# Patient Record
Sex: MALE | Race: OTHER
[De-identification: names, ages, dates, MRNs, and addresses within clinical notes are randomized per-mention and may not be internally consistent; named-entity substitution may affect disease eponyms.]

---

## 2023-09-05 ENCOUNTER — HOSPITAL ENCOUNTER (EMERGENCY)
Dept: HOSPITAL 15 - ER | Age: 23
Discharge: LEFT BEFORE DISCHARGE | End: 2023-09-05
Payer: COMMERCIAL

## 2023-09-05 VITALS
RESPIRATION RATE: 18 BRPM | OXYGEN SATURATION: 98 % | SYSTOLIC BLOOD PRESSURE: 120 MMHG | DIASTOLIC BLOOD PRESSURE: 63 MMHG

## 2023-09-05 VITALS — WEIGHT: 201.94 LBS | BODY MASS INDEX: 31.7 KG/M2 | HEIGHT: 67 IN

## 2023-09-05 VITALS — HEART RATE: 89 BPM

## 2023-09-05 DIAGNOSIS — Z79.899: ICD-10-CM

## 2023-09-05 DIAGNOSIS — R07.89: Primary | ICD-10-CM

## 2023-09-05 LAB
ALBUMIN SERPL-MCNC: 4.7 G/DL (ref 3.2–4.8)
ALP SERPL-CCNC: 151 U/L (ref 46–116)
ALT SERPL-CCNC: 158 U/L (ref 7–40)
ANION GAP SERPL CALCULATED.3IONS-SCNC: 7.7 MMOL/L (ref 5–15)
BILIRUB SERPL-MCNC: 0.5 MG/DL (ref 0.2–1)
BUN SERPL-MCNC: 13 MG/DL (ref 9–23)
BUN/CREAT SERPL: 17.8 (ref 10–20)
CALCIUM SERPL-MCNC: 9.4 MG/DL (ref 8.7–10.4)
CHLORIDE SERPL-SCNC: 104 MMOL/L (ref 98–107)
CO2 SERPL-SCNC: 25.3 MMOL/L (ref 20–30)
GLUCOSE SERPL-MCNC: 105 MG/DL (ref 74–106)
HCT VFR BLD AUTO: 43 % (ref 41–53)
HGB BLD-MCNC: 14.9 G/DL (ref 13.5–17.5)
MAGNESIUM SERPL-MCNC: 1.9 MG/DL (ref 1.6–2.6)
MCH RBC QN AUTO: 30.6 PG (ref 28–32)
MCV RBC AUTO: 88.2 FL (ref 80–100)
NRBC BLD QL AUTO: 0.1 %
POTASSIUM SERPL-SCNC: 3.8 MMOL/L (ref 3.5–5.1)
PROT SERPL-MCNC: 7.2 G/DL (ref 5.7–8.2)
SODIUM SERPL-SCNC: 137 MMOL/L (ref 136–145)

## 2023-09-05 PROCEDURE — 36415 COLL VENOUS BLD VENIPUNCTURE: CPT

## 2023-09-05 PROCEDURE — 80053 COMPREHEN METABOLIC PANEL: CPT

## 2023-09-05 PROCEDURE — 83735 ASSAY OF MAGNESIUM: CPT

## 2023-09-05 PROCEDURE — 71045 X-RAY EXAM CHEST 1 VIEW: CPT

## 2023-09-05 PROCEDURE — 85025 COMPLETE CBC W/AUTO DIFF WBC: CPT

## 2023-09-05 PROCEDURE — 93005 ELECTROCARDIOGRAM TRACING: CPT

## 2023-09-05 PROCEDURE — 85379 FIBRIN DEGRADATION QUANT: CPT

## 2023-09-05 PROCEDURE — 84484 ASSAY OF TROPONIN QUANT: CPT

## 2024-12-25 NOTE — ED.PDOC
Eye-HPI


HPI Comments


A 24 YEAR OLD MALE PRESENTS TO THE ED WITH COMPLAINT OF LEFT EYE IRRITATION.  

PATIENT STATES HE WAS GRINDING METAL 3 DAYS AGO AND THINKS HE MAY HAVE GOTTEN A 

SMALL PIECE OF METAL STUCK IN HIS LEFT EYE.  PATIENT REPORTS HE IS NOW 

EXPERIENCING LEFT EYE IRRITATION WITH MILD REDNESS.  PATIENT DENIES VISION 

CHANGES, FEVER, CHILLS, SHORTNESS OF BREATH, CHEST PAIN, ABDOMINAL PAIN, NAUSEA,

VOMITING, HEADACHE, OR OTHER COMPLAINTS. NO OTHER SYMPTOMS OR MODIFYING FACTORS 

AT THIS TIME. PATIENT IS ALERT, ORIENTED X 4, AND HAS STEADY GAIT.


Chief Complaint:  Eye Problem


Time Seen by MD:  10:10


Reviewed Notes:  Nurses Notes, Medications, Allergies


Allergies:  


Coded Allergies:  


     NO KNOWN ALLERGIES (Unverified , 9/5/23)


Home Meds


Active Scripts


Tobramycin Sulfate (Tobrex) 1 Drop Dr, 2 DROP OP QID, #5 ML


   Prov:YRN CAGE         12/25/24


Information Source:  Patient


Mode of Arrival:  Ambulatory


Timing:  Days


Duration:  Since onset, Days


Prehospital treatment:  None


Quality:  Pain, Red


Eye Location:  Left


Lids:  Normal


Conjunctiva:  Normal


Cornea:  Left eye, Abrasion


Pupils:  Normal


EOM:  Normal


Fundus:  Normal


Slit lamp exam:  Left eye, Corneal abrasion, Foreign Body, Flourescein stain:, 

Positive


Anterior chamber:  Normal


ENT Ear Exam:  Normal, Normal, Normal


Nose:  Normal


Sinuses:  Normal


Oropharynx:  Normal


Onset:  Spontaneous


Throat Exposed to:  None


History of:  None


Last Tetanus:  Unknown


Modifying factors:  Nothing


Associated signs and symptoms:  Tearing





Past Medical History


PAST MEDICAL HISTORY:  Denies


Surgical History:  Denies all surgeries





Family History


Family History:  Reviewed,noncontributory to illness





Social History


Smoker:  Non-Smoker


Alcohol:  Denies ETOH Use


Drugs:  Denies Drug Use


Lives In:  Home





Constitutional:  denies: chills, diaphoresis, fatigue, fever, malaise, sweats, 

weakness, others


EENTM:  reports: eye pain (LEFT EYE PAIN), eye redness, others (POSSIBLE FOREIGN

BODY OF LEFT EYE); denies: blurred vision, double vision, ear bleeding, ear 

discharge, ear drainage, ear pain, ear ringing, hearing loss, mouth pain, mouth 

swelling, nasal discharge, nose bleeding, nose congestion, nose pain, 

photophobia, tearing, throat pain, throat swelling, voice changes


Respiratory:  denies: cough, hemoptysis, orthopnea, SOB at rest, shortness of 

breath, SOB with excertion, stridor, wheezing, others


Cardiovascular:  denies: chest pain, dizzy spells, diaphoresis, Dyspnea on 

exertion, edema, irregular heart beat, left arm pain, lightheadedness, 

palpitations, PND, syncope, others


Gastrointestinal:  denies: abdomen distended, abdominal pain, blood streaked 

bowels, constipated, diarrhea, dysphagia, difficulty swallowing, hematemesis, 

melena, nausea, poor appetite, poor fluid intake, rectal bleeding, rectal pain, 

vomiting, others


Genitourinary:  denies: burning, dysuria, flank pain, frequency, hematuria, 

incontinence, penile discharge, penile sore, pain, testicle pain, testicle 

swelling, urgency, others


Neurological:  denies: dizziness, fainting, headache, left sided numbness, left 

sided weakness, numbness, paresthesia, pre-existing deficit, right sided 

numbness, right sided weakness, seizure, speech problems, tingling, tremors, 

weakness, others


Musculoskeletal:  denies: back pain, gout, joint pain, joint swelling, muscle 

pain, muscle stiffness, neck pain, others


Integumetry:  denies: bruises, change in color, change in hair/nails, dryness, 

laceration, lesions, lumps, rash, wounds, others


Allergic/Immunocompromised:  denies: Difficulty Healing, Frequent Infections, 

Hives, Itching, others


Hematologic/Lymphatic:  denies: anemia, blood clots, easy bleeding, easy 

bruising, swollen glands, others


Endocrine:  denies: excessive hunger, excessive sweating, excessive thirst, 

excessive urination, flushing, intolerance to cold, intolerance to heat, 

unexplained weight gain, unexplained weight loss, others


Psychiatric:  denies: anxiety, bipolar disorder, depression, hopeless, panic 

disorder, schizophrenia, sleepless, suicidal, others


All Other Systems:  Reviewed and Negative





Physical Exam


General Appearance:  No Apparent Distress, Normal


HEENT:  Cornea (L) (WOOD LAMP EXAM: +FB WITH CORNEA ABRASION, MILD 

SUBCONJUNCTIVA HEMORRHAGE, NO YELLOW DISCHARGE. VISUAL ACUITY: R-20/25, L-20/25.

), Normal ENT Inspection, PERRL/EOMI, Pharynx Normal, TMs Normal


Neck:  Full Range of Motion, Non-Tender, Normal, Normal Inspection


Respiratory:  Chest Non-Tender, Lungs Clear, No Accessory Muscle Use, No 

Respiratory Distress, Normal Breath Sounds


Cardiovascular:  No Edema, No JVD, No Murmur, No Gallop, Normal Peripheral 

Pulses, Regular Rate/Rhythm


Breast Exam:  Deferred


Gastrointestinal:  No Organomegaly, Non Tender, No Pulsatile Mass, Normal Bowel 

Sounds, Soft


Genitalia:  Deferred


Pelvic:  Deferred


Rectal:  Deferred


Extremities:  No calf tenderness, Normal capillary refill, Normal inspection, 

Normal range of motion, Non-tender, No pedal edema


Musculoskeletal :  


   Apperance:  Normal


Neurologic:  Alert, CNs II-XII nml as Tested, No Motor Deficits, Normal Affect, 

Normal Mood, No Sensory Deficits


Cerebellar Function:  Normal


Reflexes:  Normal


Skin:  Dry, Normal Color, Warm


Peripheral Pulses:  2+ carotid (R), 2+ carotid (L)


Lymphatic:  No Adenopathy





Was a procedure done?


Was a procedure done?:  Yes





Sedation


Sedation?:  No





Foreign Body Removal


Foreign body in:  Eye (LEFT EYE)


Anesthetic:  Other (TETRACAINE)


Prep:  Prep, Saline, Irrigation (LEFT EYE WITH NORMAL SALINE 50ML. )


Procedure:  Identified (+FB, +CORNEAL ABRASION), Removed (A SMALL NEEDLE WAS 

USED TO SCRAPE A SMALL PIECE OF METAL OUT OF THE PATIENT'S LEFT EYE. FOREIGN 

BODY WAS SUCCESSFULLY REMOVED.  PATIENT'S EYE WAS IRRIGATED WITH NORMAL SALINE. 

PATIENT TOLERATED WELL.)


Informed consent obtained:  No


Risks/benefits/alt described:  Yes





EENT DIFF


Eye:  Conjunctivitis, Allergic, Bacterial, Viral, Corneal Abrasion, Foreign 

Body-Conjunctiva, Foreign Body-Corneal, Foreign Body-Lid


Ear:  N/A


Nose:  N/A


Mouth:  N/A


Sore Throat:  N/A





X-Ray, Labs, Meds, VS





                                   Vital Signs








  Date Time  Temp Pulse Resp B/P (MAP) Pulse Ox O2 Delivery O2 Flow Rate FiO2


 


12/25/24 10:39  85 18  98 Room Air  


 


12/25/24 10:39 98.1 85 18 128/68 (88) 98   





 98.1       


 


12/25/24 10:10 98.1 85 18 128/68 (88) 98   








                               Current Medications








 Medications


  (Trade)  Dose


 Ordered  Sig/Katt


 Route  Start Time


 Stop Time Status Last Admin


 


 Fluorescein Sodium


  (Ful-Katie)  1 mg  ONCE  ONCE


 OP  12/25/24 10:45


 12/25/24 10:46 DC 12/25/24 10:52





 


 Tetracaine HCl


  (Tetracaine 0.5%


 Opth Soln)  1 drop  ONCE  ONCE


 EACHEYE  12/25/24 10:45


 12/25/24 10:46 DC 12/25/24 10:53











X-Ray, Labs, Meds, VS Comment


EXTERNAL MEDICAL RECORDS REVIEWED: [NONE] 


INDEPENDENT HISTORIANS: [NONE]


SOCIAL DETERMINANTS OF HEALTH: [NONE]





LABS ORDERED: NONE


REVIEWED AND INTERPRETED RESULTS: NONE





IMAGING ORDERED: NONE





TREATMENTS ORDERED:  FOREIGN BODY REMOVAL





PROCEDURES PERFORMED: NONE





CRITICAL CARE TIME: NONE





I HAVE DISCUSSED THE PATIENT WITH THE ATTENDING PHYSICIAN DR. JOE AND HE 

AGREES WITH THE PATIENT'S PLAN OF CARE AND DISPOSITION.





BASED ON HISTORY OF PRESENT ILLNESS, AND PHYSICAL EXAM, PATIENT WILL BE 

DISCHARGED HOME. DISCUSSED PLAN FOR DISCHARGE HOME WITH RX [TOBREX]. MEDICATION 

WARNINGS GIVEN. 





SHARED DECISION MAKING: PATIENT INSTRUCTED TO FOLLOW UP WITH PRIMARY CARE 

PROVIDER IN 1-2 DAYS FOR RE-EVALUATION OF SYMPTOMS. PATIENT VERBALIZES UNDERSTA

NDING TO RETURN TO ED FOR NEW OR WORSENING SYMPTOMS OR IF FOLLOW UP WITH PCP 

CANNOT BE OBTAINED. PATIENT FEELS COMFORTABLE GOING HOME AT THIS TIME. ALL 

QUESTIONS ADDRESSED AT TIME OF DISCHARGE.


Time of 1ST Reevaluation:  11:45


Reevaluation 1ST:  Improved


Patient Education/Counseling:  Diagnosis, Treatment, Need For Follow Up


Family Education/Counseling:  Diagnosis, Treatment, Need For Follow Up


Medical Screening:  No EMC Exist At This Time





Departure 1


Departure


Time of Disposition:  11:45


Impression:  


   Primary Impression:  


   History of retained foreign body fully removed


   Additional Impression:  


   Corneal abrasion, left


   Qualified Codes:  S05.02XA - Injury of conjunctiva and corneal abrasion 

   without foreign body, left eye, initial encounter


Disposition:  01 HOME / SELF CARE / HOMELESS


Condition:  Stable





Additional Instructions:  


FOLLOW-UP WITH PCP IN 1 TO 2 DAYS.  TAKE MEDICATIONS AS PRESCRIBED.  RETURN TO 

ED FOR ANY NEW OR WORSENING SYMPTOMS.


e-Prescriptions


Tobramycin Sulfate (Tobrex) 1 Drop Dr


2 DROP OP QID, #5 ML


   Prov: YRN CAGE         12/25/24


Discharged With:  Self





Critical Care Note


Critical Care Time?:  No





Stability


Stability form required:  No








I personally scribed for YRN CAGE ([email or web address]) on 12/25/24 at 11:37.  

Electronically submitted by Hilario Lundberg ([email or web address]).





YRN CAGE                 Dec 25, 2024 11:37